# Patient Record
Sex: FEMALE | Race: WHITE | URBAN - METROPOLITAN AREA
[De-identification: names, ages, dates, MRNs, and addresses within clinical notes are randomized per-mention and may not be internally consistent; named-entity substitution may affect disease eponyms.]

---

## 2023-02-01 ENCOUNTER — OFFICE VISIT (OUTPATIENT)
Dept: URBAN - METROPOLITAN AREA CLINIC 75 | Facility: CLINIC | Age: 23
End: 2023-02-01
Payer: COMMERCIAL

## 2023-02-01 DIAGNOSIS — H52.13 MYOPIA, BILATERAL: ICD-10-CM

## 2023-02-01 DIAGNOSIS — H40.013 OPEN ANGLE WITH BORDERLINE FINDINGS, LOW RISK, BILATERAL: Primary | ICD-10-CM

## 2023-02-01 PROCEDURE — 92134 CPTRZ OPH DX IMG PST SGM RTA: CPT | Performed by: OPTOMETRIST

## 2023-02-01 PROCEDURE — 92133 CPTRZD OPH DX IMG PST SGM ON: CPT | Performed by: OPTOMETRIST

## 2023-02-01 PROCEDURE — 99204 OFFICE O/P NEW MOD 45 MIN: CPT | Performed by: OPTOMETRIST

## 2023-02-01 ASSESSMENT — VISUAL ACUITY
OS: 20/20
OD: 20/20

## 2023-02-01 ASSESSMENT — INTRAOCULAR PRESSURE
OS: 15
OD: 13

## 2023-02-01 NOTE — IMPRESSION/PLAN
Impression: Open angle with borderline findings, low risk, bilateral: H40.013. Suspicion based on cupping - healthy tissue and rim  Plan: Glaucoma suspects do not usually need to be treated but do need close follow up monitoring for early signs of glaucoma damage. Some glaucoma suspects require treatment, and the same medical options of glaucoma eye drops and oral medications are utilized. No treatment needed at this time. Patient recommended Vitamin D, Turmeric, Niacin (B complex) supplement. Contact office if any loss of vision, eye pain, cloudy vision, photophobia, or dark spots or clouds blocking portions of your vision occurs.